# Patient Record
Sex: FEMALE | Race: WHITE | Employment: PART TIME | URBAN - METROPOLITAN AREA
[De-identification: names, ages, dates, MRNs, and addresses within clinical notes are randomized per-mention and may not be internally consistent; named-entity substitution may affect disease eponyms.]

---

## 2018-05-26 ENCOUNTER — APPOINTMENT (OUTPATIENT)
Dept: CT IMAGING | Age: 38
End: 2018-05-26
Attending: NURSE PRACTITIONER
Payer: OTHER GOVERNMENT

## 2018-05-26 ENCOUNTER — HOSPITAL ENCOUNTER (EMERGENCY)
Age: 38
Discharge: HOME | End: 2018-05-26
Attending: EMERGENCY MEDICINE
Payer: OTHER GOVERNMENT

## 2018-05-26 VITALS
WEIGHT: 143 LBS | DIASTOLIC BLOOD PRESSURE: 72 MMHG | SYSTOLIC BLOOD PRESSURE: 116 MMHG | RESPIRATION RATE: 18 BRPM | HEART RATE: 95 BPM | TEMPERATURE: 98.5 F | HEIGHT: 65 IN | BODY MASS INDEX: 23.82 KG/M2 | OXYGEN SATURATION: 98 %
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
## 2018-05-26 DIAGNOSIS — S01.111A EYEBROW LACERATION, RIGHT, INITIAL ENCOUNTER: ICD-10-CM

## 2018-05-26 DIAGNOSIS — S02.31XA CLOSED FRACTURE OF RIGHT ORBITAL FLOOR, INITIAL ENCOUNTER (HCC): Primary | ICD-10-CM

## 2018-05-26 DIAGNOSIS — S00.83XA CONTUSION OF FACE, INITIAL ENCOUNTER: ICD-10-CM

## 2018-05-26 LAB
APPEARANCE UR: CLEAR
BILIRUB UR QL: NEGATIVE
COLOR UR: YELLOW
GLUCOSE UR STRIP.AUTO-MCNC: NEGATIVE MG/DL
HCG UR QL: NEGATIVE
HGB UR QL STRIP: NEGATIVE
KETONES UR QL STRIP.AUTO: NEGATIVE MG/DL
LEUKOCYTE ESTERASE UR QL STRIP.AUTO: NEGATIVE
NITRITE UR QL STRIP.AUTO: NEGATIVE
PH UR STRIP: 6 [PH] (ref 5–8)
PROT UR STRIP-MCNC: NEGATIVE MG/DL
SP GR UR REFRACTOMETRY: 1.01 (ref 1–1.03)
UROBILINOGEN UR QL STRIP.AUTO: 0.2 EU/DL (ref 0.2–1)

## 2018-05-26 PROCEDURE — 70450 CT HEAD/BRAIN W/O DYE: CPT

## 2018-05-26 PROCEDURE — 99284 EMERGENCY DEPT VISIT MOD MDM: CPT

## 2018-05-26 PROCEDURE — 96372 THER/PROPH/DIAG INJ SC/IM: CPT

## 2018-05-26 PROCEDURE — 74011000250 HC RX REV CODE- 250

## 2018-05-26 PROCEDURE — 81003 URINALYSIS AUTO W/O SCOPE: CPT | Performed by: NURSE PRACTITIONER

## 2018-05-26 PROCEDURE — 70486 CT MAXILLOFACIAL W/O DYE: CPT

## 2018-05-26 PROCEDURE — 75810000293 HC SIMP/SUPERF WND  RPR

## 2018-05-26 PROCEDURE — 77030031132 HC SUT NYL COVD -A

## 2018-05-26 PROCEDURE — 74011250636 HC RX REV CODE- 250/636: Performed by: NURSE PRACTITIONER

## 2018-05-26 PROCEDURE — 77030018836 HC SOL IRR NACL ICUM -A

## 2018-05-26 PROCEDURE — 72125 CT NECK SPINE W/O DYE: CPT

## 2018-05-26 PROCEDURE — 81025 URINE PREGNANCY TEST: CPT | Performed by: NURSE PRACTITIONER

## 2018-05-26 RX ORDER — LIDOCAINE HYDROCHLORIDE AND EPINEPHRINE 20; 10 MG/ML; UG/ML
INJECTION, SOLUTION INFILTRATION; PERINEURAL
Status: COMPLETED
Start: 2018-05-26 | End: 2018-05-26

## 2018-05-26 RX ORDER — MORPHINE SULFATE 10 MG/ML
4 INJECTION, SOLUTION INTRAMUSCULAR; INTRAVENOUS
Status: COMPLETED | OUTPATIENT
Start: 2018-05-26 | End: 2018-05-26

## 2018-05-26 RX ORDER — TRAZODONE HYDROCHLORIDE 50 MG/1
25 TABLET ORAL
COMMUNITY

## 2018-05-26 RX ORDER — IBUPROFEN 800 MG/1
800 TABLET ORAL
Qty: 20 TAB | Refills: 0 | Status: SHIPPED | OUTPATIENT
Start: 2018-05-26 | End: 2018-06-02

## 2018-05-26 RX ADMIN — LIDOCAINE HYDROCHLORIDE,EPINEPHRINE BITARTRATE: 20; .01 INJECTION, SOLUTION INFILTRATION; PERINEURAL at 17:35

## 2018-05-26 RX ADMIN — MORPHINE SULFATE 4 MG: 10 INJECTION INTRAMUSCULAR; INTRAVENOUS; SUBCUTANEOUS at 17:34

## 2018-05-26 NOTE — ED TRIAGE NOTES
Pt c/o falling at the bar and hitting head creating a 2 inch laceration with additional blood coming from nose. Strong etoh smell.

## 2018-05-26 NOTE — ED PROVIDER NOTES
HPI Comments: Michael Chisholm is a 40year old female who presents to the ED after being brought in by [facility]. Pt was drinking at a local [employer] club. She was reaching for a glass when she fell off her stool. EMS report she struck her head on a chair, but the pt states she struck her head on the floor. She denies a loss of consciousness. Patient is a 40 y.o. female presenting with fall and skin laceration. The history is provided by the patient and the EMS personnel. History limited by: No communication barrier. Fall   The accident occurred less than 1 hour ago. Fall occurred: While drinking at a local night club. She fell from a height of ground level. She landed on hard floor. Point of impact: forehead. The pain is moderate. She was ambulatory at the scene. There was no entrapment after the fall. There was no drug use involved in the accident. There was alcohol use involved in the accident. Risk factors: I\"ntoxication. She has tried nothing for the symptoms. The treatment provided no relief. It is unknown when the patient last had a tetanus shot. Laceration           Past Medical History:   Diagnosis Date    Kidney stone     UTI (lower urinary tract infection)        History reviewed. No pertinent surgical history. History reviewed. No pertinent family history. Social History     Social History    Marital status:      Spouse name: N/A    Number of children: N/A    Years of education: N/A     Occupational History    Not on file. Social History Main Topics    Smoking status: Light Tobacco Smoker    Smokeless tobacco: Never Used    Alcohol use Yes    Drug use: No    Sexual activity: Not on file     Other Topics Concern    Not on file     Social History Narrative         ALLERGIES: Other medication    Review of Systems   Constitutional: Negative. HENT: Negative. Eyes: Negative. Respiratory: Negative. Cardiovascular: Negative. Gastrointestinal: Negative. Endocrine: Negative. Genitourinary: Negative. Musculoskeletal: Negative. Skin: Positive for wound (right eyebrow laceration). Allergic/Immunologic: Negative. Neurological: Negative. Hematological: Negative. Psychiatric/Behavioral: Negative. Vitals:    05/26/18 1620   BP: 132/90   Pulse: (!) 109   Resp: 18   Temp: 98.1 °F (36.7 °C)   SpO2: 99%   Weight: 64.9 kg (143 lb)   Height: 5' 5\" (1.651 m)            Physical Exam   Constitutional: She is oriented to person, place, and time. She appears well-developed and well-nourished. No distress. HENT:   Head: Normocephalic and atraumatic. Eyes: EOM are normal. Pupils are equal, round, and reactive to light. No evidence of entrapment. No evidence of globe rupture     Neck: Normal range of motion. Neck supple. Cardiovascular: Normal rate, regular rhythm, normal heart sounds and intact distal pulses. Pulmonary/Chest: Effort normal and breath sounds normal. No respiratory distress. She has no wheezes. She has no rales. Abdominal: Soft. Bowel sounds are normal. There is no tenderness. There is no rebound and no guarding. Genitourinary:   Genitourinary Comments: NE   Musculoskeletal: Normal range of motion. Neurological: She is alert and oriented to person, place, and time. No cranial nerve deficit. She exhibits normal muscle tone. Coordination normal.   Skin: Skin is warm and dry. Four cm laceration to the right eyebrow. The wound involves subcutaneous tissue. No FB. No nerve, tendon, or ligament involvement. Psychiatric: She has a normal mood and affect. Nursing note and vitals reviewed. MDM  Number of Diagnoses or Management Options  Closed fracture of right orbital floor, initial encounter [facility]):   Contusion of face, initial encounter:   Eyebrow laceration, right, initial encounter:   Fall, initial encounter:   Diagnosis management comments: MDM:  The Pt has a right sided inferior wall orbital fracture.    Will DC to home and have Pt follow up as an out patient with Dr Hugh Le. Will advise Pt not to blow nose. Pt to call Monday morning for appointment,. Amount and/or Complexity of Data Reviewed  Tests in the radiology section of CPT®: ordered and reviewed  Independent visualization of images, tracings, or specimens: yes (CT wet reads of the brain, face and neck reviewed.  )    Risk of Complications, Morbidity, and/or Mortality  Presenting problems: moderate  Diagnostic procedures: moderate  Management options: moderate    Patient Progress  Patient progress: stable        ED Course       Wound Repair  Date/Time: 5/26/2018 6:00 PM  Performed by: Malini RENEE. Preparation: skin prepped with ChloraPrep and sterile field established  Pre-procedure re-eval: Immediately prior to the procedure, the patient was reevaluated and found suitable for the planned procedure and any planned medications. Time out: Immediately prior to the procedure a time out was called to verify the correct patient, procedure, equipment, staff and marking as appropriate. .  Location: Right eyebrow. Wound length: 4 cm. Anesthesia: local infiltration    Anesthesia:  Local Anesthetic: lidocaine 1% with epinephrine  Anesthetic total: 8 mL  Foreign bodies: no foreign bodies  Irrigation solution: saline  Irrigation method: syringe  Fascia closure: 6-0 nylon  Number of sutures: 7  Technique: simple  Approximation: close  Dressing: 4x4  Patient tolerance: Patient tolerated the procedure well with no immediate complications  My total time at bedside, performing this procedure was 16-30 minutes. Diagnosis:   1. Closed fracture of right orbital floor, initial encounter ([facility] [address]    2. Eyebrow laceration, right, initial encounter    3. Contusion of face, initial encounter    4. Fall, initial encounter          Disposition:   Discharged to Home.       Follow-up Information     Follow up With Details Comments Contact Info    Deacon Beal MD Call on Monday for a follow up appointment. Derick Liu  [facility]  [phone]            Patient's Medications   Start Taking    IBUPROFEN (MOTRIN) 800 MG TABLET    Take 1 Tab by mouth every eight (8) hours as needed for Pain for up to 7 days. Continue Taking    HYDROCODONE-ACETAMINOPHEN (NORCO) 5-325 MG PER TABLET    Take 1 Tab by mouth every four (4) hours as needed for Pain. Max Daily Amount: 6 Tabs. TRAZODONE (DESYREL) 50 MG TABLET    Take 25 mg by mouth nightly. TRIMETHOPRIM-SULFAMETHOXAZOLE (BACTRIM)  MG PER TABLET    Take 1 Tab by mouth two (2) times a day.    These Medications have changed    No medications on file   Stop Taking    No medications on file

## 2018-05-27 NOTE — DISCHARGE INSTRUCTIONS
[employer] Activation    Thank you for requesting access to [employer]. Please follow the instructions below to securely access and download your online medical record. [employer] allows you to send messages to your doctor, view your test results, renew your prescriptions, schedule appointments, and more. How Do I Sign Up? 1. In your internet browser, go to [email or web address]  2. Click on the First Time User? Click Here link in the Sign In box. You will be redirect to the New Member Sign Up page. 3. Enter your [employer] Access Code exactly as it appears below. You will not need to use this code after youve completed the sign-up process. If you do not sign up before the expiration date, you must request a new code. [employer] Access Code: Activation code not generated  Current [employer] Status: Active (This is the date your [employer] access code will )    4. Enter the last four digits of your Social Security Number (xxxx) and Date of Birth (mm/dd/yyyy) as indicated and click Submit. You will be taken to the next sign-up page. 5. Create a [employer] ID. This will be your [employer] login ID and cannot be changed, so think of one that is secure and easy to remember. 6. Create a [employer] password. You can change your password at any time. 7. Enter your Password Reset Question and Answer. This can be used at a later time if you forget your password. 8. Enter your e-mail address. You will receive e-mail notification when new information is available in [address] 9. Click Sign Up. You can now view and download portions of your medical record. 10. Click the Download Summary menu link to download a portable copy of your medical information. Additional Information    If you have questions, please visit the Frequently Asked Questions section of the [employer] website at [email or web address] Remember, [employer] is NOT to be used for urgent needs. For medical emergencies, dial 911.     Follow up alton Kapoor Osmany Perdomo on Monday morning by calling for an appointment. If your symptoms worsen before then return at once. Wound check in 48 hours. Sutures to be removed in 7 days. Do not blow your nose until cleared by the eye Doctor.